# Patient Record
Sex: FEMALE
[De-identification: names, ages, dates, MRNs, and addresses within clinical notes are randomized per-mention and may not be internally consistent; named-entity substitution may affect disease eponyms.]

---

## 2022-11-21 ENCOUNTER — APPOINTMENT (OUTPATIENT)
Dept: ORTHOPEDIC SURGERY | Facility: CLINIC | Age: 62
End: 2022-11-21

## 2022-11-21 VITALS — BODY MASS INDEX: 41.95 KG/M2 | HEIGHT: 70 IN | WEIGHT: 293 LBS

## 2022-11-21 DIAGNOSIS — Z86.79 PERSONAL HISTORY OF OTHER DISEASES OF THE CIRCULATORY SYSTEM: ICD-10-CM

## 2022-11-21 DIAGNOSIS — M16.0 BILATERAL PRIMARY OSTEOARTHRITIS OF HIP: ICD-10-CM

## 2022-11-21 PROBLEM — Z00.00 ENCOUNTER FOR PREVENTIVE HEALTH EXAMINATION: Status: ACTIVE | Noted: 2022-11-21

## 2022-11-21 PROCEDURE — 99203 OFFICE O/P NEW LOW 30 MIN: CPT | Mod: 25

## 2022-11-21 PROCEDURE — 73560 X-RAY EXAM OF KNEE 1 OR 2: CPT | Mod: 50

## 2022-11-21 PROCEDURE — 73502 X-RAY EXAM HIP UNI 2-3 VIEWS: CPT

## 2022-11-21 PROCEDURE — 20610 DRAIN/INJ JOINT/BURSA W/O US: CPT | Mod: 50

## 2022-11-21 NOTE — PHYSICAL EXAM
[Bilateral] : knee bilaterally [5___] : hamstring 5[unfilled]/5 [Negative] : negative Alexey's [FreeTextEntry8] :   Minimal groin tenderness [FreeTextEntry9] :  good range of motion with minimal pain [de-identified] :  good strength throughout [de-identified] :  warm well perfused [] : no extensor lag [TWNoteComboBox7] : flexion 125 degrees [de-identified] : extension 0 degrees

## 2022-11-21 NOTE — DATA REVIEWED
[FreeTextEntry2] :   Bilateral hip x-rays: moderate osteoarthritis.  No acute fractures, subluxations, dislocations. [FreeTextEntry1] : Bilateral knee x-rays: bilateral advanced osteoarthritis, no acute fractures, subluxations, dislocations.

## 2022-11-21 NOTE — DISCUSSION/SUMMARY
[de-identified] :   Bilateral hips: discussed x-rays patient showing moderate osteoarthritis.  Discussed treatment options in detail including weight loss, range-of-motion exercises, physical therapy, possible hip replacement future,  Weight loss, Tylenol extra-strength.  Possible follow-up for hip injections..\par \par   bilateral knees: discussed with patient that she has advanced osteoarthritis bilateral knees.  Discussed treatment options in detail including rest, Tylenol, range-of-motion exercises, physical therapy, cortisone injections, knee replacement surgery.  Weight loss.  Patient agreed to cortisone injection.\par \par  advised patient continue using cane\par Dexamethasone and Lidocaine  \par \par Anesthesia: ethyl chloride sprayed topically. Dexamethasone 20mg/5mL 2 cc.  \par \par Lidocaine 1%: 2 cc.  \par \par   \par \par Medication was injected   Bilateral knees after verbal consent using sterile preparation and technique. The risks, benefits, and alternatives to cortisone injection were explained in full to the patient. Risks outlined include but are not limited to infection, sepsis, bleeding, scarring, skin discoloration, temporary increase in pain, syncopal episode, failure to resolve symptoms, allergic reaction, symptom recurrence, and elevation of blood sugar in diabetics. Patient understood the risks. All questions were answered. After discussion of options, patient requested an injection. Oral informed consent was obtained and sterile prep was done of the injection site. Sterile technique was utilized for the procedure including the preparation of the solutions used for the injection. Patient tolerated the procedure well. Advised to ice the injection site this evening. \par \par Patient having difficulty ambulating.  Patient cannot  commute to work due to severe osteoarthritis and difficulty ambulation, difficulty driving.\par \par  follow-up in 3-4 months for re-evaluation.  Call if any questions or concerns.  Patient understands agrees with plan.  Seen under supervision of Dr. Claudio.

## 2022-11-21 NOTE — HISTORY OF PRESENT ILLNESS
[de-identified] : Patient is a 62-year-old female here for bilateral hip and bilateral knee pain.  Patient states that she has a long history of bilateral hip pain and bilateral knee pain due to osteoarthritis.  Patient states over the past 6 months to a year her pain has been worsening. patient states that sometimes she does have some lower back pain but most of her pain lies in the knees and hip area.

## 2023-03-06 ENCOUNTER — APPOINTMENT (OUTPATIENT)
Dept: ORTHOPEDIC SURGERY | Facility: CLINIC | Age: 63
End: 2023-03-06
Payer: COMMERCIAL

## 2023-03-06 DIAGNOSIS — M17.0 BILATERAL PRIMARY OSTEOARTHRITIS OF KNEE: ICD-10-CM

## 2023-03-06 PROCEDURE — 99213 OFFICE O/P EST LOW 20 MIN: CPT | Mod: 25

## 2023-03-06 PROCEDURE — 20610 DRAIN/INJ JOINT/BURSA W/O US: CPT | Mod: 50

## 2023-03-06 NOTE — HISTORY OF PRESENT ILLNESS
[de-identified] : Patient is a 62-year-old female here for evaluation of bilateral knee osteoarthritis.  Patient was last given cortisone injection bilateral knees November 2022.  Patient states cortisone injections did help for a while.  Patient states pain worsens with ambulation, standing long periods of time, walking long distances.  Patient states she has been having a hard time getting to work due to the commute and pain.  Denies recent injury/trauma, numbness/tingling.

## 2023-03-06 NOTE — DATA REVIEWED
[FreeTextEntry1] : Bilateral knee x-rays: bilateral advanced osteoarthritis, no acute fractures, subluxations, dislocations. [FreeTextEntry2] :   Bilateral hip x-rays: moderate osteoarthritis.  No acute fractures, subluxations, dislocations.

## 2023-03-06 NOTE — DISCUSSION/SUMMARY
[de-identified] : \par \par   bilateral knees: discussed with patient that she has advanced osteoarthritis bilateral knees.  Discussed treatment options in detail including rest, Tylenol, range-of-motion exercises, physical therapy, cortisone injections, knee replacement surgery.  Weight loss.  Patient agreed to cortisone injection.\par \par  advised patient continue using cane\par Dexamethasone and Lidocaine  \par \par Anesthesia: ethyl chloride sprayed topically. Dexamethasone 20mg/5mL 2 cc.  \par \par Lidocaine 1%: 2 cc.  \par \par   \par \par Medication was injected   Bilateral knees after verbal consent using sterile preparation and technique. The risks, benefits, and alternatives to cortisone injection were explained in full to the patient. Risks outlined include but are not limited to infection, sepsis, bleeding, scarring, skin discoloration, temporary increase in pain, syncopal episode, failure to resolve symptoms, allergic reaction, symptom recurrence, and elevation of blood sugar in diabetics. Patient understood the risks. All questions were answered. After discussion of options, patient requested an injection. Oral informed consent was obtained and sterile prep was done of the injection site. Sterile technique was utilized for the procedure including the preparation of the solutions used for the injection. Patient tolerated the procedure well. Advised to ice the injection site this evening. \par \par Patient having difficulty ambulating.  Patient cannot  commute to work due to severe osteoarthritis and difficulty ambulation, difficulty driving.  Advised patient to work from home.\par \par  follow-up in 3-4 months for re-evaluation.  Call if any questions or concerns.  Patient understands agrees with plan.  Seen under supervision of Dr. Claudio.

## 2023-03-06 NOTE — PHYSICAL EXAM
[Bilateral] : knee bilaterally [5___] : quadriceps 5[unfilled]/5 [Negative] : negative Alexey's [] : mildly antalgic [TWNoteComboBox7] : flexion 125 degrees [de-identified] : extension 0 degrees

## 2023-07-10 ENCOUNTER — APPOINTMENT (OUTPATIENT)
Dept: ORTHOPEDIC SURGERY | Facility: CLINIC | Age: 63
End: 2023-07-10

## 2023-07-14 ENCOUNTER — APPOINTMENT (OUTPATIENT)
Dept: ORTHOPEDIC SURGERY | Facility: CLINIC | Age: 63
End: 2023-07-14